# Patient Record
Sex: MALE | Race: WHITE | NOT HISPANIC OR LATINO | Employment: FULL TIME | URBAN - METROPOLITAN AREA
[De-identification: names, ages, dates, MRNs, and addresses within clinical notes are randomized per-mention and may not be internally consistent; named-entity substitution may affect disease eponyms.]

---

## 2023-04-08 ENCOUNTER — OFFICE VISIT (OUTPATIENT)
Dept: URGENT CARE | Facility: URGENT CARE | Age: 23
End: 2023-04-08
Payer: COMMERCIAL

## 2023-04-08 VITALS
WEIGHT: 248 LBS | TEMPERATURE: 100.5 F | OXYGEN SATURATION: 95 % | DIASTOLIC BLOOD PRESSURE: 64 MMHG | HEART RATE: 110 BPM | SYSTOLIC BLOOD PRESSURE: 127 MMHG | RESPIRATION RATE: 20 BRPM

## 2023-04-08 DIAGNOSIS — B34.9 VIRAL SYNDROME: ICD-10-CM

## 2023-04-08 DIAGNOSIS — R19.7 VOMITING AND DIARRHEA: Primary | ICD-10-CM

## 2023-04-08 DIAGNOSIS — R11.10 VOMITING AND DIARRHEA: Primary | ICD-10-CM

## 2023-04-08 LAB
DEPRECATED S PYO AG THROAT QL EIA: NEGATIVE
GROUP A STREP BY PCR: NOT DETECTED

## 2023-04-08 PROCEDURE — 87651 STREP A DNA AMP PROBE: CPT

## 2023-04-08 PROCEDURE — 99203 OFFICE O/P NEW LOW 30 MIN: CPT

## 2023-04-08 RX ORDER — LOPERAMIDE HYDROCHLORIDE 2 MG/1
TABLET ORAL
Qty: 16 TABLET | Refills: 0 | Status: SHIPPED | OUTPATIENT
Start: 2023-04-08

## 2023-04-08 ASSESSMENT — ENCOUNTER SYMPTOMS
VOMITING: 1
CHILLS: 1
FEVER: 1
MYALGIAS: 1
ABDOMINAL PAIN: 1
SWEATS: 1
DIARRHEA: 1

## 2023-04-08 NOTE — LETTER
[facility] URGENT [facility] [place]  [address] [address]  Phone: [phone]  Fax: [phone]    April 8, 2023        Narciso Souza  [address]  [address]  [facility] [address]          To whom it may concern:    RE: Narciso Souza    Patient was seen and treated today at our clinic and missed work.    Please contact me for questions or concerns.      Sincerely,        [facility]

## 2023-04-08 NOTE — PROGRESS NOTES
ASSESSMENT:  1. Vomiting and diarrhea    2. Viral syndrome        PLAN:  Orders Placed This Encounter     loperamide (IMODIUM A-D) 2 MG tablet       Patient Instructions   Use of over-the-counter Imodium for treatment of the diarrhea.  Follow packaging directions.    Use of zofran, for nausea as needed.  Follow up with primary care provider if you do not get resolution with the course of treatment.  Return to walk-in care if complication or new symptoms arise in the interim.        HOME TREATMENT OF VOMITING AND DIARRHEA    These are guidelines, not rigid rules.  If the patient is not doing well, or if you have questions, do not hesitate to call.    Vomiting     After the first vomiting episode, do not give any food or drink for 2-3 hours.  Then start frequent sips of clear liquids-the amount ranges from 1 or 2 teaspoons for children (or a few ice chips) to 2 or 4 tablespoons for adults-every 15 minutes.  Don t give any solid foods.    Clear liquid means anything you can see through.  They should not have any caffeine or carbonation.  Avoid apple juice as it sometimes worsens diarrhea.  For infants and children under two, use an electrolyte replacement like Pedialyte-either liquid or popsicle form.  For older children and adults, try water, Gatorade, Jell-O water (add double the water), or popsicles.  Some of the clear liquids should contain sugar.    When the vomiting is spaced 3 or 4 hours apart, you may increase the amount of clear liquids.  As that stays down, start nibbles of starchy foods-cheerios or saltine crackers then rice, dry toast or potatoes (without butter).    As soon as the vomiting stops, you can add in regular foods.  We advise no fatty foods or big meals until there has been no vomiting for about 24 hours.  Then the patient can return to normal eating and drinking.    Watch for dehydration.  If a baby cries without tears, or if the patient pees less than 4 times in 24 hours, it is time to  call.    Call if there is vomiting of blood, sleeping all the time, extreme weakness, severe abdominal pain, a baby or child won t smile or play at all, or if there are any other concerns.    Diarrhea     We now know that the sooner we get to a normal diet, the better.      If the patient has vomiting with diarrhea, follow the above recommendations until the vomiting has spaced out.      Then add starches and bland food like bananas, pasta, and oatmeal.  For infants, you may continue breastfeeding.  If on formula, try a soy formula for a while instead of a lactose based formula.  You may also try baby cereal mixed with water or juice other than apple.  If this is tolerated, you may add cooked fruits and vegetables and advance to regular food as soon as possible.      You may find that cow s milk and apple juice worsen diarrhea, as do fatty foods so add these carefully.  You may try a soy based milk for a few days instead of regular cow s milk.  Make sure the patient gets plenty of fluids too.  For children, a fluid replacement like Pedialyte in liquid form or popsicles is a good choice.     If there is no vomiting, you may continue with regular food, avoiding milk products and apple juice.  If this is not tolerated well, follow the above guidelines starting with starches.    We do not advise the use of medications to stop diarrhea in infants or children.    Watch for dehydration, as with vomiting.  Call for any symptoms listed in that section, or if there is any blood in the stools.  Call for any other concerns as well.      Patient Education  Noninfectious Gastroenteritis (Adult)    Gastroenteritis can cause nausea, vomiting, diarrhea, and abdominal cramping. This may occur as a result of food sensitivity, inflammation of your gastrointestinal tract, medicines, stress, or other causes not related to infection. Your symptoms will usually last from 1 to 3 days, but can last longer. Antibiotics are not effective, but  simple home treatment will be helpful.  Home care  Medicine    You may use acetaminophen or NSAID medicines like ibuprofen or naproxen to control fever, unless another medicine is prescribed. (Note: If you have chronic liver or kidney disease, or ever had a stomach ulcer or gastrointestinalI bleeding, talk with your healthcare provider before using these medicines.) Aspirin should never be used in anyone under 18 years of age who is ill with a fever. It may cause severe liver damage. Don't increase your NSAID medicines if you are already taking these medicines for another condition (like arthritis). Don't use NSAIDS if you are on aspirin (such as for heart disease, or after a stroke).    If medicines for diarrhea or vomiting are prescribed, take only as directed.  General care and preventing spread of the illness    If symptoms are severe, rest at home for the next 24 hours or until you feel better.    Hand washing with soap and water is the best way to prevent the spread of infection. Wash your hands after touching anyone who is sick.    Wash your hands after using the toilet and before meals. Clean the toilet after each use.    Caffeine, tobacco, and alcohol can make your diarrhea, cramping, and pain worse.  Diet    Water and clear liquids are important so you do not get dehydrated. Drink a small amount at a time.    Do not force yourself to eat, especially if you have cramps, vomiting, or diarrhea. When you finally decide to start eating, do not eat large amounts at a time, even if you are hungry.    If you eat, avoid fatty, greasy, spicy, or fried foods.    Do not eat dairy products if you have diarrhea; they can make the diarrhea worse.  During the first 24 hours (the first full day), follow the diet below:    Beverages: Water, clear liquids, soft drinks without caffeine, like ginger ale; mineral water (plain or flavored); decaffeinated tea and coffee.    Soups: Clear broth, consommé, and bouillon Sports drinks  aren't a good choice because they have too much sugar and not enough electrolytes. In this case, commercially available products called oral rehydration solutions are best.    Desserts: Plain gelatin, popsicles, and fruit juice bars.  During the next 24 hours (the second day), you may add the following to the above if you have improved. If not, continue what you did the first day:    Hot cereal, plain toast, bread, rolls, crackers    Plain noodles, rice, mashed potatoes, chicken noodle or rice soup    Unsweetened canned fruit (avoid pineapple), bananas    Limit caffeine and chocolate. No spices or seasonings except salt.  During the next 24 hours    Gradually resume a normal diet, as you feel better and your symptoms improve.    If at any time your symptoms start getting worse, go back to clear liquids until you feel better.  Food preparation    If you have diarrhea, you should not prepare food for others. When you  prepare food for yourself, wash your hands before and after.    Wash your hands after using cutting boards, countertops, and knives that have been in contact with raw food.    Keep uncooked meats away from cooked and ready-to-eat foods.  Follow-up care  Follow up with your healthcare provider if you are not improving over the next 2 to 3 days, or as advised. If a stool (diarrhea) sample was taken, call for the results as directed.  When to seek medical advice  Call your healthcare provider right away if any of these occur:     Increasing abdominal pain or constant lower right abdominal pain    Continued vomiting (unable to keep liquids down)    Frequent diarrhea (more than 5 times a day)    Blood in vomit or stool (black or red color)    Inability to tolerate solid food after a few days.    Dark urine, reduced urine output    Weakness, dizziness    Drowsiness    Fever of 100.4 F (38.0 C) or higher, or as directed by your healthcare provider    New rash  Call 911  Call 911 if any of these  occur:    Trouble breathing    Chest pain    Confusion    Severe drowsiness or trouble awakening    Seizure    Stiff neck  Date Last Reviewed: 11/16/2015 2000-2017 [employer]. [address] [place] [address], [place] [address]. All rights reserved. This information is not intended as a substitute for professional medical care. Always follow your healthcare professional's instructions.         Patient Education  Diet for Vomiting or Diarrhea (Adult)    Your symptoms may return or get worse after eating certain foods listed below. If this happens, stop eating these foods until your symptoms ease and you feel better.  Once the vomiting stops, follow the steps below.   During the first 12 to 24 hours  During the first 12 to 24 hours, follow this diet:    Drinks. Plain water, sport drinks like electrolyte solutions, soft drinks without caffeine, mineral water (plain or flavored), clear fruit juices, and decaffeinated tea and coffee.    Soups. Clear broth.    Desserts. Plain gelatin, popsicles, and fruit juice bars. As you feel better, you may add 6 to 8 ounces of yogurt per day. If you have diarrhea, don't have foods or drinks that contain sugar, high-fructose corn syrup, or sugar alcohols.  During the next 24 hours  During the next 24 hours you may add the following to the above:    Hot cereal, plain toast, bread, rolls, and crackers    Plain noodles, rice, mashed potatoes, and chicken noodle or rice soup    Unsweetened canned fruit (but not pineapple) and bananas  Don't eat more than 15 grams of fat a day. Do this by staying away from margarine, butter, oils, mayonnaise, sauces, gravies, fried foods, peanut butter, meat, poultry, and fish.  Don't eat much fiber. Stay away from raw or cooked vegetables, fresh fruits (except bananas), and bran cereals.  Limit how much caffeine and chocolate you have. Do not use any spices or seasonings except salt.  During the next 24 hours  Slowly go back to your normal diet,  as you feel better and your symptoms ease.  Date Last Reviewed: 8/1/2016 2000-2017 [employer]. [address] [place] [address], [place] [address]. All rights reserved. This information is not intended as a substitute for professional medical care. Always follow your healthcare professional's instructions.                 Olivia Stuart is a 22 year old, presenting for the following health issues:  Vomiting (Onset 4/5/2023. Negative Covid and Influenza testing on Wednesday and Thursday @ [facility]. ), Diarrhea (Ongoing), Bloated (Abdominal discomfort), and Fever    Patient does not admit to having blood or mucus in the stools.  He has had a low-grade fever of 100-101 temperature max at home with a temp of 100.1 temperature in the office today with no use of antipyretic.  Associated headache with 1-2 episodes of loose watery stools per day.  There is been no blood or mucus in the stools.  Patient has had nausea and vomiting and decreased appetite.  Symptoms have persisted over the last 5 days.  Patient had a previous evaluation with COVID testing and influenza testing which was returned as -2 days ago.  Patient has been able to eat and drink normally he has been passing flatus and having bowel movements and has not had vomiting after eating.    Vomiting   This is a new problem. The current episode started more than 2 days ago. The problem occurs 2 to 4 times per day. The problem has been resolved. The emesis has an appearance of stomach contents. The maximum temperature recorded prior to his arrival was 100 to 100.9 F. Associated symptoms include abdominal pain, chills, diarrhea, a fever, myalgias and sweats.   Diarrhea  This is a new problem. The current episode started in the past 7 days. The problem occurs daily. The problem has been unchanged. Associated symptoms include abdominal pain, chills, a fever, myalgias and vomiting. He has tried lying down, relaxation and rest for the symptoms. The treatment  provided mild relief.   Fever  This is a new problem. The current episode started in the past 7 days. The problem occurs daily. The problem has been unchanged. Associated symptoms include abdominal pain, chills, a fever, myalgias and vomiting. He has tried acetaminophen for the symptoms.        Review of Systems   Constitutional: Positive for chills and fever.   Gastrointestinal: Positive for abdominal pain, diarrhea and vomiting.   Musculoskeletal: Positive for myalgias.        Objective    /64 (BP Location: Right arm, Patient Position: Sitting)   Pulse 110   Temp (!) 100.5  F (38.1  C) (Tympanic)   Resp 20   Wt 112.5 kg (248 lb)   SpO2 95%   There is no height or weight on file to calculate BMI.  Physical Exam  Constitutional:       Appearance: Normal appearance.   HENT:      Head: Normocephalic and atraumatic.      Right Ear: Tympanic membrane normal.      Left Ear: Tympanic membrane normal.      Mouth/Throat:      Mouth: Mucous membranes are moist.      Pharynx: Oropharynx is clear.   Cardiovascular:      Rate and Rhythm: Normal rate and regular rhythm.      Heart sounds: Normal heart sounds.   Abdominal:      General: There is no distension.      Palpations: There is no mass.      Tenderness: There is abdominal tenderness (left lower quadrant). There is no guarding or rebound.   Musculoskeletal:      Cervical back: Normal range of motion and neck supple. No tenderness.   Lymphadenopathy:      Cervical: No cervical adenopathy.   Neurological:      Mental Status: He is alert.            Results for orders placed or performed in visit on 04/08/23   Streptococcus A Rapid Screen w/Reflex to PCR - Clinic Collect     Status: Normal    Specimen: Throat; Swab   Result Value Ref Range    Group A Strep antigen Negative Negative     Patient has had COVID and influenza testing at another clinic that was returned as -2 days ago according to his history.

## 2023-04-08 NOTE — PATIENT INSTRUCTIONS
Use of over-the-counter Imodium for treatment of the diarrhea.  Follow packaging directions.    Use of zofran, for nausea as needed.  Follow up with primary care provider if you do not get resolution with the course of treatment.  Return to walk-in care if complication or new symptoms arise in the interim.        HOME TREATMENT OF VOMITING AND DIARRHEA    These are guidelines, not rigid rules.  If the patient is not doing well, or if you have questions, do not hesitate to call.    Vomiting     After the first vomiting episode, do not give any food or drink for 2-3 hours.  Then start frequent sips of clear liquids-the amount ranges from 1 or 2 teaspoons for children (or a few ice chips) to 2 or 4 tablespoons for adults-every 15 minutes.  Don t give any solid foods.    Clear liquid means anything you can see through.  They should not have any caffeine or carbonation.  Avoid apple juice as it sometimes worsens diarrhea.  For infants and children under two, use an electrolyte replacement like Pedialyte-either liquid or popsicle form.  For older children and adults, try water, Gatorade, Jell-O water (add double the water), or popsicles.  Some of the clear liquids should contain sugar.    When the vomiting is spaced 3 or 4 hours apart, you may increase the amount of clear liquids.  As that stays down, start nibbles of starchy foods-cheerios or saltine crackers then rice, dry toast or potatoes (without butter).    As soon as the vomiting stops, you can add in regular foods.  We advise no fatty foods or big meals until there has been no vomiting for about 24 hours.  Then the patient can return to normal eating and drinking.    Watch for dehydration.  If a baby cries without tears, or if the patient pees less than 4 times in 24 hours, it is time to call.    Call if there is vomiting of blood, sleeping all the time, extreme weakness, severe abdominal pain, a baby or child won t smile or play at all, or if there are any other  concerns.    Diarrhea     We now know that the sooner we get to a normal diet, the better.      If the patient has vomiting with diarrhea, follow the above recommendations until the vomiting has spaced out.      Then add starches and bland food like bananas, pasta, and oatmeal.  For infants, you may continue breastfeeding.  If on formula, try a soy formula for a while instead of a lactose based formula.  You may also try baby cereal mixed with water or juice other than apple.  If this is tolerated, you may add cooked fruits and vegetables and advance to regular food as soon as possible.      You may find that cow s milk and apple juice worsen diarrhea, as do fatty foods so add these carefully.  You may try a soy based milk for a few days instead of regular cow s milk.  Make sure the patient gets plenty of fluids too.  For children, a fluid replacement like Pedialyte in liquid form or popsicles is a good choice.     If there is no vomiting, you may continue with regular food, avoiding milk products and apple juice.  If this is not tolerated well, follow the above guidelines starting with starches.    We do not advise the use of medications to stop diarrhea in infants or children.    Watch for dehydration, as with vomiting.  Call for any symptoms listed in that section, or if there is any blood in the stools.  Call for any other concerns as well.      Patient Education   Noninfectious Gastroenteritis (Adult)    Gastroenteritis can cause nausea, vomiting, diarrhea, and abdominal cramping. This may occur as a result of food sensitivity, inflammation of your gastrointestinal tract, medicines, stress, or other causes not related to infection. Your symptoms will usually last from 1 to 3 days, but can last longer. Antibiotics are not effective, but simple home treatment will be helpful.  Home care  Medicine  You may use acetaminophen or NSAID medicines like ibuprofen or naproxen to control fever, unless another medicine is  prescribed. (Note: If you have chronic liver or kidney disease, or ever had a stomach ulcer or gastrointestinalI bleeding, talk with your healthcare provider before using these medicines.) Aspirin should never be used in anyone under 18 years of age who is ill with a fever. It may cause severe liver damage. Don't increase your NSAID medicines if you are already taking these medicines for another condition (like arthritis). Don't use NSAIDS if you are on aspirin (such as for heart disease, or after a stroke).  If medicines for diarrhea or vomiting are prescribed, take only as directed.  General care and preventing spread of the illness  If symptoms are severe, rest at home for the next 24 hours or until you feel better.  Hand washing with soap and water is the best way to prevent the spread of infection. Wash your hands after touching anyone who is sick.  Wash your hands after using the toilet and before meals. Clean the toilet after each use.  Caffeine, tobacco, and alcohol can make your diarrhea, cramping, and pain worse.  Diet  Water and clear liquids are important so you do not get dehydrated. Drink a small amount at a time.  Do not force yourself to eat, especially if you have cramps, vomiting, or diarrhea. When you finally decide to start eating, do not eat large amounts at a time, even if you are hungry.  If you eat, avoid fatty, greasy, spicy, or fried foods.  Do not eat dairy products if you have diarrhea; they can make the diarrhea worse.  During the first 24 hours (the first full day), follow the diet below:  Beverages: Water, clear liquids, soft drinks without caffeine, like ginger ale; mineral water (plain or flavored); decaffeinated tea and coffee.  Soups: Clear broth, consommé, and bouillon Sports drinks aren't a good choice because they have too much sugar and not enough electrolytes. In this case, commercially available products called oral rehydration solutions are best.  Desserts: Plain gelatin,  popsicles, and fruit juice bars.  During the next 24 hours (the second day), you may add the following to the above if you have improved. If not, continue what you did the first day:  Hot cereal, plain toast, bread, rolls, crackers  Plain noodles, rice, mashed potatoes, chicken noodle or rice soup  Unsweetened canned fruit (avoid pineapple), bananas  Limit caffeine and chocolate. No spices or seasonings except salt.  During the next 24 hours  Gradually resume a normal diet, as you feel better and your symptoms improve.  If at any time your symptoms start getting worse, go back to clear liquids until you feel better.  Food preparation  If you have diarrhea, you should not prepare food for others. When you  prepare food for yourself, wash your hands before and after.  Wash your hands after using cutting boards, countertops, and knives that have been in contact with raw food.  Keep uncooked meats away from cooked and ready-to-eat foods.  Follow-up care  Follow up with your healthcare provider if you are not improving over the next 2 to 3 days, or as advised. If a stool (diarrhea) sample was taken, call for the results as directed.  When to seek medical advice  Call your healthcare provider right away if any of these occur:   Increasing abdominal pain or constant lower right abdominal pain  Continued vomiting (unable to keep liquids down)  Frequent diarrhea (more than 5 times a day)  Blood in vomit or stool (black or red color)  Inability to tolerate solid food after a few days.  Dark urine, reduced urine output  Weakness, dizziness  Drowsiness  Fever of 100.4 F (38.0 C) or higher, or as directed by your healthcare provider  New rash  Call 911  Call 911 if any of these occur:  Trouble breathing  Chest pain  Confusion  Severe drowsiness or trouble awakening  Seizure  Stiff neck  Date Last Reviewed: 11/16/2015 2000-2017 [employer]. [address] [facility], [place] [address]. All rights reserved. This  information is not intended as a substitute for professional medical care. Always follow your healthcare professional's instructions.         Patient Education   Diet for Vomiting or Diarrhea (Adult)    Your symptoms may return or get worse after eating certain foods listed below. If this happens, stop eating these foods until your symptoms ease and you feel better.  Once the vomiting stops, follow the steps below.   During the first 12 to 24 hours  During the first 12 to 24 hours, follow this diet:  Drinks. Plain water, sport drinks like electrolyte solutions, soft drinks without caffeine, mineral water (plain or flavored), clear fruit juices, and decaffeinated tea and coffee.  Soups. Clear broth.  Desserts. Plain gelatin, popsicles, and fruit juice bars. As you feel better, you may add 6 to 8 ounces of yogurt per day. If you have diarrhea, don't have foods or drinks that contain sugar, high-fructose corn syrup, or sugar alcohols.  During the next 24 hours  During the next 24 hours you may add the following to the above:  Hot cereal, plain toast, bread, rolls, and crackers  Plain noodles, rice, mashed potatoes, and chicken noodle or rice soup  Unsweetened canned fruit (but not pineapple) and bananas  Don't eat more than 15 grams of fat a day. Do this by staying away from margarine, butter, oils, mayonnaise, sauces, gravies, fried foods, peanut butter, meat, poultry, and fish.  Don't eat much fiber. Stay away from raw or cooked vegetables, fresh fruits (except bananas), and bran cereals.  Limit how much caffeine and chocolate you have. Do not use any spices or seasonings except salt.  During the next 24 hours  Slowly go back to your normal diet, as you feel better and your symptoms ease.  Date Last Reviewed: 8/1/2016 2000-2017 [employer]. [address] [place] [address], [place] [address]. All rights reserved. This information is not intended as a substitute for professional medical care. Always follow your  healthcare professional's instructions.

## 2023-09-10 ENCOUNTER — HEALTH MAINTENANCE LETTER (OUTPATIENT)
Age: 23
End: 2023-09-10

## 2024-11-03 ENCOUNTER — HEALTH MAINTENANCE LETTER (OUTPATIENT)
Age: 24
End: 2024-11-03